# Patient Record
(demographics unavailable — no encounter records)

---

## 2025-07-30 NOTE — HISTORY OF PRESENT ILLNESS
[de-identified] : 69-year-old male presenting to the for evaluation of significant neck pain and loss of range of motion.  Patient states on Sunday he woke up with significant pain and difficulty with range of motion.  He denies any inciting event or trauma.  States no abnormal or different activities were performed the day prior.  Since then he has been having continued painful range of motion.  He was seen at [facility] on Monday where he was provided with muscle relaxers and anti-inflammatories.  He does not feel any significant relief in symptoms after 1 to 2 days of these medications.  He states he can feel his pain primarily at the base of the skull radiating down into the posterior aspect of the neck into the upper shoulder region.  He denies any radiating pain into bilateral upper extremities.  No numbness tingling or weakness in bilateral upper extremities.  JERI questionnaire is negative. There is no ataxia or other abnormal gait. Patient is not falling more than usual and does not have any decrease in dexterity.  Denies any diabetes, anticoagulant or antianxiety/antidepression medications.  No history of glaucoma.

## 2025-07-30 NOTE — DISCUSSION/SUMMARY
[Medication Risks Reviewed] : Medication risks reviewed [de-identified] : 69 year old male presents with symptoms suggestive of cervical spondylosis with torticollis.  30 minutes was spent reviewing the x-rays as well as discussing with the patient their clinical presentation, diagnosis and providing education. Conservative treatment was discussed with the patient at length. Anticipatory guidance regarding disease process, avoidance of acute exacerbation this was discussed at length and all patients commenting concerns were answered to the patient's satisfaction. Physical therapy for decrease pain and increase function was ordered. The patient was given home exercises as approved by [employer] and directed toward this particular process.  Medrol Dosepak for anti-inflammatory properties was provided to be followed by meloxicam 15 mg daily as needed with meals.  He can also utilize over-the-counter Tylenol up to 1000 mg 3 times daily as needed.  He can continue with cyclobenzaprine 10 mg nightly, and if helpful he can request a refill. Home exercise including stretching on a daily basis for 20-30 minutes was recommended. Heat, ice, topical were discussed as needed. The patient will followup in 3-4 weeks at which point in time if symptoms continue we will order MRI studies to guide treatment plan including possible injection therapy with pain management versus surgical option. All questions and concerns were addressed with the patient and they are in agreement with this plan.

## 2025-07-30 NOTE — PHYSICAL EXAM
[de-identified] : Cervical Exam  CONSTITUTIONAL:  Patient is a very pleasant individual who is well-nourished and appears stated age.  PSYCHIATRIC:  Alert and oriented times three and in no apparent distress, and participates with orthopedic evaluation well. HEAD:  Atraumatic and  nonsyndromic in appearance. EENT: No thyromegaly, EOMI. RESPIRATORY:  Respiratory rate is regular, not dyspneic on examination. LYMPHATICS:  There is no cervical or axillary lymphadenopathy. INTEGUMENTARY:  Skin is clean, dry, and intact about the bilateral upper extremities and cervical spine.  VASCULAR:   There is brisk capillary refill about the bilateral upper extremities and radial pulses are 2/4.  NEUROLOGIC:  Negative L'hirmitte, negative Spurling's sign. There are no pathologic reflexes. There is no decrease in sensation of the bilateral upper extremities on Wartenberg pinwheel/manual examination.  Deep tendon reflexes are well-maintained at +2/4 of the bilateral upper extremities and are symmetric. MUSCULOSKELETAL:  There is no visible muscular atrophy.  Manual motor strength is well maintained in the bilateral upper extremities.  Cervical range of motion is significantly decreased secondary to pain and apprehension.  The patient ambulates in a non-myelopathic manner. Normal secondary orthopaedic exam of bilateral shoulders, elbows and hands.  Elbow flexion and extension, wrist extension, finger flexion and abduction are well maintained.  Significant posterior cervical hyper tonicity noticed in the musculature.  Tenderness palpation along the right greater than left trapezius. [de-identified] : X-ray 4 views of the cervical spine obtained and reviewed in the office today demonstrates straightening of cervical lordosis with cervical spondylosis noted at C3-C4, C4-C5, C5-C6, C6-C7.  Very minimal movement noted with flexion-extension views of the cervical spine.  No acute osseous abnormalities noted.